# Patient Record
Sex: FEMALE | Race: WHITE | NOT HISPANIC OR LATINO | URBAN - METROPOLITAN AREA
[De-identification: names, ages, dates, MRNs, and addresses within clinical notes are randomized per-mention and may not be internally consistent; named-entity substitution may affect disease eponyms.]

---

## 2019-04-19 ENCOUNTER — OFFICE VISIT (OUTPATIENT)
Dept: URBAN - METROPOLITAN AREA CLINIC 16 | Facility: CLINIC | Age: 74
End: 2019-04-19
Payer: MEDICARE

## 2019-04-19 DIAGNOSIS — H25.813 COMBINED FORMS OF AGE-RELATED CATARACT, BILATERAL: Primary | ICD-10-CM

## 2019-04-19 PROCEDURE — 76519 ECHO EXAM OF EYE: CPT | Performed by: OPHTHALMOLOGY

## 2019-04-19 PROCEDURE — 99204 OFFICE O/P NEW MOD 45 MIN: CPT | Performed by: OPHTHALMOLOGY

## 2019-04-19 PROCEDURE — 99214 OFFICE O/P EST MOD 30 MIN: CPT | Performed by: OPHTHALMOLOGY

## 2019-04-19 RX ORDER — BESIFLOXACIN 6 MG/ML
0.6 % SUSPENSION OPHTHALMIC
Qty: 5 | Refills: 0 | Status: INACTIVE
Start: 2019-04-19 | End: 2019-04-23

## 2019-04-19 RX ORDER — DUREZOL 0.5 MG/ML
0.05 % EMULSION OPHTHALMIC
Qty: 5 | Refills: 0 | Status: INACTIVE
Start: 2019-04-19 | End: 2019-04-23

## 2019-04-19 ASSESSMENT — PACHYMETRY
OS: 2.59
OS: 21.27
OD: 21.21
OD: 2.68

## 2019-04-19 ASSESSMENT — INTRAOCULAR PRESSURE
OS: 16
OD: 16

## 2019-04-19 ASSESSMENT — KERATOMETRY
OD: 43.00
OS: 43.13

## 2019-04-19 ASSESSMENT — VISUAL ACUITY
OS: 20/30
OD: 20/30

## 2019-04-19 NOTE — IMPRESSION/PLAN
Impression: Combined forms of age-related cataract, bilateral: H25.813. .  Visually significant, quality of life issue, could improve with surgery. Plan: Cataracts account for the patient's complaints. Discussed all risks, benefits, alternatives, procedures and recovery. Patient understands changing glasses will not improve vision. Patient desires to have surgery, recommend phacoemulsification with intraocular lens implant OS.   lvl 2 - pt wants toric premium IOL

## 2019-04-25 ENCOUNTER — SURGERY (OUTPATIENT)
Dept: URBAN - METROPOLITAN AREA SURGERY 11 | Facility: SURGERY | Age: 74
End: 2019-04-25
Payer: MEDICARE

## 2019-04-26 ENCOUNTER — POST-OPERATIVE VISIT (OUTPATIENT)
Dept: URBAN - METROPOLITAN AREA CLINIC 16 | Facility: CLINIC | Age: 74
End: 2019-04-26

## 2019-04-26 DIAGNOSIS — Z09 ENCNTR FOR F/U EXAM AFT TRTMT FOR COND OTH THAN MALIG NEOPLM: Primary | ICD-10-CM

## 2019-04-26 ASSESSMENT — INTRAOCULAR PRESSURE
OD: 12
OS: 16

## 2019-05-01 ENCOUNTER — POST-OPERATIVE VISIT (OUTPATIENT)
Dept: URBAN - METROPOLITAN AREA CLINIC 16 | Facility: CLINIC | Age: 74
End: 2019-05-01

## 2019-05-01 ASSESSMENT — INTRAOCULAR PRESSURE
OD: 13
OS: 11

## 2019-05-09 ENCOUNTER — SURGERY (OUTPATIENT)
Dept: URBAN - METROPOLITAN AREA SURGERY 11 | Facility: SURGERY | Age: 74
End: 2019-05-09
Payer: MEDICARE

## 2019-05-09 PROCEDURE — 66984 XCAPSL CTRC RMVL W/O ECP: CPT | Performed by: OPHTHALMOLOGY

## 2019-05-10 ENCOUNTER — POST-OPERATIVE VISIT (OUTPATIENT)
Dept: URBAN - METROPOLITAN AREA CLINIC 16 | Facility: CLINIC | Age: 74
End: 2019-05-10

## 2019-05-10 ASSESSMENT — INTRAOCULAR PRESSURE
OD: 13
OS: 16

## 2019-05-17 ENCOUNTER — POST-OPERATIVE VISIT (OUTPATIENT)
Dept: URBAN - METROPOLITAN AREA CLINIC 16 | Facility: CLINIC | Age: 74
End: 2019-05-17

## 2019-05-17 ASSESSMENT — VISUAL ACUITY
OD: 20/25
OS: 20/25

## 2019-05-17 ASSESSMENT — INTRAOCULAR PRESSURE
OD: 12
OS: 11

## 2019-05-30 ENCOUNTER — POST-OPERATIVE VISIT (OUTPATIENT)
Dept: URBAN - METROPOLITAN AREA CLINIC 16 | Facility: CLINIC | Age: 74
End: 2019-05-30

## 2019-05-30 ASSESSMENT — VISUAL ACUITY
OD: 20/25
OS: 20/25

## 2019-05-30 ASSESSMENT — INTRAOCULAR PRESSURE
OS: 11
OD: 11

## 2019-10-17 ENCOUNTER — OFFICE VISIT (OUTPATIENT)
Dept: URBAN - METROPOLITAN AREA CLINIC 16 | Facility: CLINIC | Age: 74
End: 2019-10-17
Payer: MEDICARE

## 2019-10-17 DIAGNOSIS — Z96.1 PRESENCE OF PSEUDOPHAKIA: Primary | ICD-10-CM

## 2019-10-17 PROCEDURE — 92014 COMPRE OPH EXAM EST PT 1/>: CPT | Performed by: OPTOMETRIST

## 2019-10-17 ASSESSMENT — INTRAOCULAR PRESSURE
OS: 11
OD: 12

## 2020-12-02 ENCOUNTER — OFFICE VISIT (OUTPATIENT)
Dept: URBAN - METROPOLITAN AREA CLINIC 16 | Facility: CLINIC | Age: 75
End: 2020-12-02
Payer: MEDICARE

## 2020-12-02 PROCEDURE — 92014 COMPRE OPH EXAM EST PT 1/>: CPT | Performed by: OPTOMETRIST

## 2020-12-02 ASSESSMENT — INTRAOCULAR PRESSURE
OD: 12
OS: 13

## 2020-12-02 ASSESSMENT — KERATOMETRY
OD: 42.75
OS: 42.63

## 2024-01-16 ENCOUNTER — OFFICE VISIT (OUTPATIENT)
Dept: URBAN - METROPOLITAN AREA CLINIC 16 | Facility: CLINIC | Age: 79
End: 2024-01-16
Payer: MEDICARE

## 2024-01-16 DIAGNOSIS — H52.4 PRESBYOPIA: ICD-10-CM

## 2024-01-16 DIAGNOSIS — Z96.1 PRESENCE OF PSEUDOPHAKIA: Primary | ICD-10-CM

## 2024-01-16 PROCEDURE — 92004 COMPRE OPH EXAM NEW PT 1/>: CPT | Performed by: OPTOMETRIST

## 2024-01-16 ASSESSMENT — INTRAOCULAR PRESSURE
OD: 15
OS: 15

## 2024-01-16 ASSESSMENT — VISUAL ACUITY
OD: 20/30
OS: 20/25